# Patient Record
Sex: FEMALE | Race: WHITE | NOT HISPANIC OR LATINO | Employment: OTHER | URBAN - METROPOLITAN AREA
[De-identification: names, ages, dates, MRNs, and addresses within clinical notes are randomized per-mention and may not be internally consistent; named-entity substitution may affect disease eponyms.]

---

## 2018-02-02 ENCOUNTER — OFFICE VISIT (OUTPATIENT)
Dept: MEDICAL GROUP | Facility: MEDICAL CENTER | Age: 63
End: 2018-02-02
Payer: COMMERCIAL

## 2018-02-02 ENCOUNTER — HOSPITAL ENCOUNTER (OUTPATIENT)
Dept: LAB | Facility: MEDICAL CENTER | Age: 63
End: 2018-02-02
Attending: FAMILY MEDICINE
Payer: COMMERCIAL

## 2018-02-02 VITALS
SYSTOLIC BLOOD PRESSURE: 100 MMHG | HEIGHT: 63 IN | WEIGHT: 130 LBS | TEMPERATURE: 97.5 F | OXYGEN SATURATION: 96 % | HEART RATE: 65 BPM | BODY MASS INDEX: 23.04 KG/M2 | DIASTOLIC BLOOD PRESSURE: 62 MMHG

## 2018-02-02 DIAGNOSIS — Z12.11 SCREENING FOR COLON CANCER: ICD-10-CM

## 2018-02-02 DIAGNOSIS — K04.7 DENTAL INFECTION: ICD-10-CM

## 2018-02-02 DIAGNOSIS — L81.9 CHANGING PIGMENTED SKIN LESION: ICD-10-CM

## 2018-02-02 DIAGNOSIS — Z13.220 SCREENING FOR HYPERLIPIDEMIA: ICD-10-CM

## 2018-02-02 DIAGNOSIS — Z13.1 SCREENING FOR DIABETES MELLITUS: ICD-10-CM

## 2018-02-02 DIAGNOSIS — F43.29 PROLONGED GRIEF REACTION: ICD-10-CM

## 2018-02-02 DIAGNOSIS — R30.0 DYSURIA: ICD-10-CM

## 2018-02-02 DIAGNOSIS — E03.9 ACQUIRED HYPOTHYROIDISM: ICD-10-CM

## 2018-02-02 DIAGNOSIS — Z12.31 ENCOUNTER FOR SCREENING MAMMOGRAM FOR BREAST CANCER: ICD-10-CM

## 2018-02-02 DIAGNOSIS — Z13.0 SCREENING FOR DEFICIENCY ANEMIA: ICD-10-CM

## 2018-02-02 LAB
ALBUMIN SERPL BCP-MCNC: 4.3 G/DL (ref 3.2–4.9)
ALBUMIN/GLOB SERPL: 1.7 G/DL
ALP SERPL-CCNC: 52 U/L (ref 30–99)
ALT SERPL-CCNC: 14 U/L (ref 2–50)
ANION GAP SERPL CALC-SCNC: 5 MMOL/L (ref 0–11.9)
APPEARANCE UR: CLEAR
AST SERPL-CCNC: 25 U/L (ref 12–45)
BASOPHILS # BLD AUTO: 0.5 % (ref 0–1.8)
BASOPHILS # BLD: 0.03 K/UL (ref 0–0.12)
BILIRUB SERPL-MCNC: 0.9 MG/DL (ref 0.1–1.5)
BILIRUB UR STRIP-MCNC: NEGATIVE MG/DL
BUN SERPL-MCNC: 11 MG/DL (ref 8–22)
CALCIUM SERPL-MCNC: 9.1 MG/DL (ref 8.5–10.5)
CHLORIDE SERPL-SCNC: 105 MMOL/L (ref 96–112)
CHOLEST SERPL-MCNC: 223 MG/DL (ref 100–199)
CO2 SERPL-SCNC: 28 MMOL/L (ref 20–33)
COLOR UR AUTO: NORMAL
CREAT SERPL-MCNC: 0.7 MG/DL (ref 0.5–1.4)
EOSINOPHIL # BLD AUTO: 0.1 K/UL (ref 0–0.51)
EOSINOPHIL NFR BLD: 1.8 % (ref 0–6.9)
ERYTHROCYTE [DISTWIDTH] IN BLOOD BY AUTOMATED COUNT: 44.2 FL (ref 35.9–50)
GLOBULIN SER CALC-MCNC: 2.6 G/DL (ref 1.9–3.5)
GLUCOSE SERPL-MCNC: 85 MG/DL (ref 65–99)
GLUCOSE UR STRIP.AUTO-MCNC: NEGATIVE MG/DL
HCT VFR BLD AUTO: 43.1 % (ref 37–47)
HDLC SERPL-MCNC: 70 MG/DL
HGB BLD-MCNC: 14.3 G/DL (ref 12–16)
IMM GRANULOCYTES # BLD AUTO: 0.02 K/UL (ref 0–0.11)
IMM GRANULOCYTES NFR BLD AUTO: 0.4 % (ref 0–0.9)
KETONES UR STRIP.AUTO-MCNC: NEGATIVE MG/DL
LDLC SERPL CALC-MCNC: 136 MG/DL
LEUKOCYTE ESTERASE UR QL STRIP.AUTO: NEGATIVE
LYMPHOCYTES # BLD AUTO: 1.48 K/UL (ref 1–4.8)
LYMPHOCYTES NFR BLD: 26.3 % (ref 22–41)
MCH RBC QN AUTO: 31.2 PG (ref 27–33)
MCHC RBC AUTO-ENTMCNC: 33.2 G/DL (ref 33.6–35)
MCV RBC AUTO: 94.1 FL (ref 81.4–97.8)
MONOCYTES # BLD AUTO: 0.4 K/UL (ref 0–0.85)
MONOCYTES NFR BLD AUTO: 7.1 % (ref 0–13.4)
NEUTROPHILS # BLD AUTO: 3.59 K/UL (ref 2–7.15)
NEUTROPHILS NFR BLD: 63.9 % (ref 44–72)
NITRITE UR QL STRIP.AUTO: NEGATIVE
NRBC # BLD AUTO: 0 K/UL
NRBC BLD-RTO: 0 /100 WBC
PH UR STRIP.AUTO: 7.5 [PH] (ref 5–8)
PLATELET # BLD AUTO: 213 K/UL (ref 164–446)
PMV BLD AUTO: 10.2 FL (ref 9–12.9)
POTASSIUM SERPL-SCNC: 3.8 MMOL/L (ref 3.6–5.5)
PROT SERPL-MCNC: 6.9 G/DL (ref 6–8.2)
PROT UR QL STRIP: NEGATIVE MG/DL
RBC # BLD AUTO: 4.58 M/UL (ref 4.2–5.4)
RBC UR QL AUTO: NEGATIVE
SODIUM SERPL-SCNC: 138 MMOL/L (ref 135–145)
SP GR UR STRIP.AUTO: 1
T4 FREE SERPL-MCNC: 1.03 NG/DL (ref 0.53–1.43)
TRIGL SERPL-MCNC: 87 MG/DL (ref 0–149)
TSH SERPL DL<=0.005 MIU/L-ACNC: 1.84 UIU/ML (ref 0.38–5.33)
UROBILINOGEN UR STRIP-MCNC: 0.2 MG/DL
WBC # BLD AUTO: 5.6 K/UL (ref 4.8–10.8)

## 2018-02-02 PROCEDURE — 85025 COMPLETE CBC W/AUTO DIFF WBC: CPT

## 2018-02-02 PROCEDURE — 84439 ASSAY OF FREE THYROXINE: CPT

## 2018-02-02 PROCEDURE — 80053 COMPREHEN METABOLIC PANEL: CPT

## 2018-02-02 PROCEDURE — 84443 ASSAY THYROID STIM HORMONE: CPT

## 2018-02-02 PROCEDURE — 80061 LIPID PANEL: CPT

## 2018-02-02 PROCEDURE — 99214 OFFICE O/P EST MOD 30 MIN: CPT | Performed by: FAMILY MEDICINE

## 2018-02-02 PROCEDURE — 81002 URINALYSIS NONAUTO W/O SCOPE: CPT | Performed by: FAMILY MEDICINE

## 2018-02-02 PROCEDURE — 36415 COLL VENOUS BLD VENIPUNCTURE: CPT

## 2018-02-02 RX ORDER — CLINDAMYCIN HYDROCHLORIDE 300 MG/1
300 CAPSULE ORAL 3 TIMES DAILY
Qty: 21 CAP | Refills: 0 | Status: SHIPPED | OUTPATIENT
Start: 2018-02-02 | End: 2018-10-30

## 2018-02-02 ASSESSMENT — PATIENT HEALTH QUESTIONNAIRE - PHQ9: CLINICAL INTERPRETATION OF PHQ2 SCORE: 0

## 2018-02-02 NOTE — ASSESSMENT & PLAN NOTE
Patient was found to be hypothyroid but the medications made her feel worse so she stopped it over 3 years ago.  Was doing acupuncture and that helped but can't afford it right now.  Denies palpitations, skin changes, temperature intolerance, or changes in bowel habits

## 2018-02-02 NOTE — PATIENT INSTRUCTIONS
Hypothyroidism  Hypothyroidism is a disorder of the thyroid. The thyroid is a large gland that is located in the lower front of the neck. The thyroid releases hormones that control how the body works. With hypothyroidism, the thyroid does not make enough of these hormones.  CAUSES  Causes of hypothyroidism may include:  · Viral infections.  · Pregnancy.  · Your own defense system (immune system) attacking your thyroid.  · Certain medicines.  · Birth defects.  · Past radiation treatments to your head or neck.  · Past treatment with radioactive iodine.  · Past surgical removal of part or all of your thyroid.  · Problems with the gland that is located in the center of your brain (pituitary).  SIGNS AND SYMPTOMS  Signs and symptoms of hypothyroidism may include:  · Feeling as though you have no energy (lethargy).  · Inability to tolerate cold.  · Weight gain that is not explained by a change in diet or exercise habits.  · Dry skin.  · Coarse hair.  · Menstrual irregularity.  · Slowing of thought processes.  · Constipation.  · Sadness or depression.  DIAGNOSIS   Your health care provider may diagnose hypothyroidism with blood tests and ultrasound tests.  TREATMENT  Hypothyroidism is treated with medicine that replaces the hormones that your body does not make. After you begin treatment, it may take several weeks for symptoms to go away.  HOME CARE INSTRUCTIONS   · Take medicines only as directed by your health care provider.  · If you start taking any new medicines, tell your health care provider.  · Keep all follow-up visits as directed by your health care provider. This is important. As your condition improves, your dosage needs may change. You will need to have blood tests regularly so that your health care provider can watch your condition.  SEEK MEDICAL CARE IF:  · Your symptoms do not get better with treatment.  · You are taking thyroid replacement medicine and:  ¨ You sweat excessively.  ¨ You have tremors.  ¨ You  feel anxious.  ¨ You lose weight rapidly.  ¨ You cannot tolerate heat.  ¨ You have emotional swings.  ¨ You have diarrhea.  ¨ You feel weak.  SEEK IMMEDIATE MEDICAL CARE IF:   · You develop chest pain.  · You develop an irregular heartbeat.  · You develop a rapid heartbeat.     This information is not intended to replace advice given to you by your health care provider. Make sure you discuss any questions you have with your health care provider.     Document Released: 12/18/2006 Document Revised: 01/08/2016 Document Reviewed: 05/05/2015  [employer] Patient Education ©2016 [employer]

## 2018-02-04 ENCOUNTER — HOSPITAL ENCOUNTER (OUTPATIENT)
Facility: MEDICAL CENTER | Age: 63
End: 2018-02-04
Attending: FAMILY MEDICINE
Payer: COMMERCIAL

## 2018-02-04 PROCEDURE — 82274 ASSAY TEST FOR BLOOD FECAL: CPT

## 2018-02-07 ENCOUNTER — TELEPHONE (OUTPATIENT)
Dept: MEDICAL GROUP | Facility: MEDICAL CENTER | Age: 63
End: 2018-02-07

## 2018-02-07 NOTE — TELEPHONE ENCOUNTER
Pt notified of normal lab results with the exception of her cholesterol being slightly high. Pt understands and was most concerned with her thyroid.

## 2018-02-12 NOTE — ASSESSMENT & PLAN NOTE
Symptom onset: 7 days ago   Current symptoms: urgent, frequent voids. No blood noted in urine.  Since onset symptoms are: unchanged  Associated symptoms: negative for fever, flank pain, nausea and vomiting, vaginal discharge, pelvic pain.  History is negative for frequent UTI.

## 2018-02-12 NOTE — PROGRESS NOTES
Chief Complaint   Patient presents with   • Establish Care     from lamar Noriega Maria E Scales is a 62 y.o. female here to establish care and for evaluation and management of:    Leann is here to establish care at this office. [facility]      HPI:    Acquired hypothyroidism  Patient was found to be hypothyroid but the medications made her feel worse so she stopped it over 3 years ago.  Was doing acupuncture and that helped but can't afford it right now.  Denies palpitations, skin changes, temperature intolerance, or changes in bowel habits        Prolonged grief reaction  Patient is still struggling with the death of her  and the significant life stressors related to this. She's had financial stressors as she quit her job in without her 's income she feels like she is always behind. She and her daughter went to the [employer] in [place] to celebrate her daughters college graduation. She has a good support network. She denies any suicidal thoughts or plans.    Dysuria  Symptom onset: 7 days ago   Current symptoms: urgent, frequent voids. No blood noted in urine.  Since onset symptoms are: unchanged  Associated symptoms: negative for fever, flank pain, nausea and vomiting, vaginal discharge, pelvic pain.  History is negative for frequent UTI.       Changing pigmented skin lesion  Patient has a changing pigmented skin lesion on her face that she would like a referral to dermatology for. She has had significant sun exposure.      Allergies   Allergen Reactions   • Penicillins Anaphylaxis       Current medicines (including changes today)  Current Outpatient Prescriptions   Medication Sig Dispense Refill   • clindamycin (CLEOCIN) 300 MG Cap Take 1 Cap by mouth 3 times a day. 21 Cap 0     No current facility-administered medications for this visit.      She  has a past medical history of Allergy; Anxiety; Arthritis; Chronic headaches; and IBD  "(inflammatory bowel disease).  She  has a past surgical history that includes abdominal hysterectomy total and eye surgery.  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  No nausea or vomiting.  No chest pain or palpitations.  No cough or SOB.  No pain with urination or hematuria.  No black or bloody stools.  All other systems reviewed and are negative     Objective:     Blood pressure 100/62, pulse 65, temperature 36.4 °C (97.5 °F), height 1.6 m (5' 3\"), weight 59 kg (130 lb), SpO2 96 %. Body mass index is 23.03 kg/m².  Physical Exam:      Well developed, well nourished.  Alert, oriented in no acute distress.  Psych: Eye contact is good, speech goal directed, affect calm  Eyes: conjunctiva non-injected, sclera non-icteric.  Ears: Pinna normal. TM pearly gray.   Nose: Nares are patent.  Normal mucosa  Mouth: Oral mucous membranes pink and moist with no lesions. Slight swelling and tenderness of the left lower    gum at the molarsNeck Supple.  No adenopathy or masses in the neck or supraclavicular regions. No thyromegaly  Lungs: clear to auscultation bilaterally with good excursion. No wheezes or rhonchi  CV: regular rate and rhythm. No murmur  Abdomen: soft, nontender, no masses or organomegaly.  No rebound or guarding  Ext: no edema, color normal, vascularity normal, temperature normal    Skin: Brown irregular lesion on the cheek with some darkened areas    Assessment and Plan:   The following treatment plan was " discussed    1. Prolonged grief reaction  Discussed support groups and increasing exercise. Patient does not want medications.    2. Acquired hypothyroidism  Check labs and await results  - TSH; Future  - FREE THYROXINE; Future    3. Dysuria  Normal urinalysis. Increase fluid intake  - POCT Urinalysis    4. Screening for deficiency anemia  Screening labs ordered.  Await results for counseling.    - CBC WITH DIFFERENTIAL; Future    5. Screening for diabetes mellitus  Screening labs ordered.  Await results for counseling.    - COMP METABOLIC PANEL; Future    6. Screening for hyperlipidemia  Screening labs ordered.  Await results for counseling.    - LIPID PROFILE; Future    7. Encounter for screening mammogram for breast cancer    - MA-SCREEN MAMMO W/CAD-BILAT; Future    8. Screening for colon cancer    - OCCULT BLOOD FECES IMMUNOASSAY (FIT); Future    9. Changing pigmented skin lesion    - REFERRAL TO DERMATOLOGY    10. Dental infection    - clindamycin (CLEOCIN) 300 MG Cap; Take 1 Cap by mouth 3 times a day.  Dispense: 21 Cap; Refill: 0      Records  reviewed  Any change or worsening of signs or symptoms, patient encouraged to follow-up or report to the emergency room for further evaluation. Patient understands and agrees.    Followup: Return in about 1 year (around 2/2/2019).

## 2018-02-12 NOTE — ASSESSMENT & PLAN NOTE
Patient has a changing pigmented skin lesion on her face that she would like a referral to dermatology for. She has had significant sun exposure.

## 2018-02-12 NOTE — ASSESSMENT & PLAN NOTE
Patient is still struggling with the death of her  and the significant life stressors related to this. She's had financial stressors as she quit her job in without her 's income she feels like she is always behind. She and her daughter went to the [employer] in [place] to celebrate her daughters college graduation. She has a good support network. She denies any suicidal thoughts or plans.

## 2018-02-13 DIAGNOSIS — Z12.11 SCREENING FOR COLON CANCER: ICD-10-CM

## 2018-02-14 LAB — HEMOCCULT STL QL IA: NEGATIVE

## 2018-05-17 ENCOUNTER — OFFICE VISIT (OUTPATIENT)
Dept: DERMATOLOGY | Facility: IMAGING CENTER | Age: 63
End: 2018-05-17
Payer: COMMERCIAL

## 2018-05-17 VITALS — HEIGHT: 62 IN | BODY MASS INDEX: 23.37 KG/M2 | WEIGHT: 127 LBS | TEMPERATURE: 97.7 F

## 2018-05-17 DIAGNOSIS — L29.9 PRURITUS: ICD-10-CM

## 2018-05-17 DIAGNOSIS — L81.4 LENTIGINES: ICD-10-CM

## 2018-05-17 DIAGNOSIS — L82.0 SEBORRHEIC KERATOSES, INFLAMED: ICD-10-CM

## 2018-05-17 DIAGNOSIS — D18.01 CHERRY ANGIOMA: ICD-10-CM

## 2018-05-17 PROCEDURE — 99203 OFFICE O/P NEW LOW 30 MIN: CPT | Mod: 25 | Performed by: DERMATOLOGY

## 2018-05-17 PROCEDURE — 17110 DESTRUCTION B9 LES UP TO 14: CPT | Performed by: DERMATOLOGY

## 2018-05-17 ASSESSMENT — ENCOUNTER SYMPTOMS
FEVER: 0
CHILLS: 0

## 2018-05-17 NOTE — PROGRESS NOTES
Dermatology New Patient Visit    Chief Complaint   Patient presents with   • Establish Care       Subjective:     HPI:   Leann Scales is a 63 y.o. female presenting for    Full skin exam, h/o +significant sun exposure    Red spots on the body  Increasing in number over the years  None with recent change in size  One on the right thigh since teens  No itching/bleeding/pain  No treatments    HPI/location: spots along the braline  Time present: 5+ yeras  Painful lesion: Yes  Itching lesion: Yes  Enlarging lesion: Yes  Anything make it better or worse? Avoiding wearing a bra    History of skin cancer: No  History of precancers/actinic keratoses: No  History of biopsies:Yes, Details: benign  History of blistering/severe sunburns:No  Family history of skin cancer:No  Family history of atypical moles:Yes, Details: Father            Past Medical History:   Diagnosis Date   • Allergy    • Anxiety    • Arthritis    • Chronic headaches    • IBD (inflammatory bowel disease)        Current Outpatient Prescriptions on File Prior to Visit   Medication Sig Dispense Refill   • clindamycin (CLEOCIN) 300 MG Cap Take 1 Cap by mouth 3 times a day. 21 Cap 0     No current facility-administered medications on file prior to visit.        Allergies   Allergen Reactions   • Penicillins Anaphylaxis       Family History   Problem Relation Age of Onset   • Stroke Mother    • Heart Disease Father    • Stroke Father        Social History     Social History   • Marital status:      Spouse name: N/A   • Number of children: N/A   • Years of education: N/A     Occupational History   • Not on file.     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   Skin: Positive for itching. Negative for rash.   All other systems reviewed and are negative.       Objective:     A full mucocutaneous exam was completed including: scalp, hair, ears, face, eyelids, conjunctiva, lips, gums/tongue/oropharynx, neck, chest breasts, abdomen, back, left and right upper extremities (including hands/digits and fingernails), left and right lower extremities (including feet/toes, toenails), buttocks, excluding external genitalia (patient refusal) with the following pertinent findings listed below. Remaining above-listed examined areas within normal limits / negative for rashes or lesions.    Temperature 36.5 °C (97.7 °F), height 1.575 m (5' 2\"), weight 57.6 kg (127 lb).    Physical Exam   Constitutional: She is oriented to person, place, and time and well-developed, well-nourished, and in no distress.   HENT:   Head: Normocephalic and atraumatic.   Right Ear: External ear normal.   Left Ear: External ear normal.   Nose: Nose normal.   Mouth/Throat: Oropharynx is clear and moist.   Eyes: Conjunctivae and lids are normal.   Neck: Normal range of motion. Neck supple.   Cardiovascular: Intact distal pulses.    Pulmonary/Chest: Effort normal.   Neurological: She is alert and oriented to person, place, and time.   Skin: Skin is warm and dry.        Psychiatric: Mood and affect normal.   Vitals reviewed.      DATA: none applicable to review    Assessment and Plan:     1. Lentigines  - Benign-appearing nature of lesions discussed. Advised to return to clinic for any new or concerning changes.  - ABCDE's of melanoma discussed    2. Cherry angiomas  - Benign-appearing nature of lesions discussed. Advised to return to clinic for any new or concerning changes.    3. Seborrheic keratoses, inflamed, Pruritus  CRYOTHERAPY:  Risks (including, but not limited to: hypo or hyperpigmentation, " redness, blister, blood blister, recurrence, need for further treatment, infection, scar) and benefits of cryotherapy discussed. Patient verbally agreed to proceed with treatment. 2 cryotherapy freeze thaw cycles of 10 seconds were applied to 4 lesions on the back with cryac. Patient tolerated procedure well. Aftercare instructions given.    Followup: Return in about 1 year (around 5/17/2019) for franko.    Roberta العلي M.D.

## 2018-10-30 ENCOUNTER — OFFICE VISIT (OUTPATIENT)
Dept: MEDICAL GROUP | Facility: LAB | Age: 63
End: 2018-10-30
Payer: COMMERCIAL

## 2018-10-30 VITALS
TEMPERATURE: 98.3 F | HEART RATE: 82 BPM | BODY MASS INDEX: 23.94 KG/M2 | DIASTOLIC BLOOD PRESSURE: 62 MMHG | SYSTOLIC BLOOD PRESSURE: 116 MMHG | HEIGHT: 62 IN | OXYGEN SATURATION: 99 % | WEIGHT: 130.07 LBS | RESPIRATION RATE: 16 BRPM

## 2018-10-30 DIAGNOSIS — E03.9 ACQUIRED HYPOTHYROIDISM: ICD-10-CM

## 2018-10-30 DIAGNOSIS — R53.83 OTHER FATIGUE: ICD-10-CM

## 2018-10-30 PROCEDURE — 99214 OFFICE O/P EST MOD 30 MIN: CPT | Performed by: FAMILY MEDICINE

## 2018-10-30 NOTE — ASSESSMENT & PLAN NOTE
In July she sold her house and she lived there until Septemeber.  She bought a fixer-up condo and moved in there over Labor Day.  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  She is still stressed about finances.  Her daughter thinks she is having more problems remembering things.

## 2018-11-01 NOTE — PROGRESS NOTES
"Subjective:     Chief Complaint   Patient presents with   • Memory Loss       Leann Scales is a 63 y.o. female here today for evaluation and management of:    Other fatigue  In July she sold her house and she lived there until Septemeber.  She bought a fixer-up condo and moved in there over Labor Day.  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  She is still stressed about finances.  Her daughter thinks she is having more problems remembering things.      Acquired hypothyroidism  Patient has a history of subclinical hypothyroidism but is not on any medications.       Allergies   Allergen Reactions   • Penicillins Anaphylaxis       Current medicines (including changes today)  No current outpatient prescriptions on file.     No current facility-administered medications for this visit.        She  has a past medical history of Allergy; Anxiety; Arthritis; Chronic headaches; and IBD (inflammatory bowel disease).    Patient Active Problem List    Diagnosis Date Noted   • Other fatigue 10/30/2018   • Prolonged grief reaction 02/02/2018   • Acquired hypothyroidism 02/02/2018   • Dysuria 02/02/2018   • Changing pigmented skin lesion 02/02/2018   • Dental infection 02/02/2018       ROS   No fever or chills.  No nausea or vomiting.  No chest pain or palpitations.  No cough or SOB.  No pain with urination or hematuria.  No black or bloody stools.       Objective:     Blood pressure 116/62, pulse 82, temperature 36.8 °C (98.3 °F), temperature source Temporal, resp. rate 16, height 1.575 m (5' 2\"), weight 59 kg (130 lb 1.1 oz), SpO2 99 %. Body mass index is 23.79 kg/m².   Physical Exam:  Well developed, well nourished.  Alert, oriented in no acute distress.  Eye contact is good, speech goal directed, affect calm  Eyes: conjunctiva non-injected, sclera non-icteric. PERRL. EOM's intact  Ears: Pinna normal. TM pearly gray.   Nose: Nares are patent.  Normal mucosa  Mouth: Oral mucous membranes pink and moist with no " lesions.  Neck Supple.  No adenopathy or masses in the neck or supraclavicular regions. No thyromegaly  Lungs: clear to auscultation bilaterally with good excursion. No wheezes or rhonchi  CV: regular rate and rhythm. No murmur  Abdomen: soft, nontender, no masses or organomegaly.  No rebound or guarding  Mini-Mental status exam 30/30          Assessment and Plan:   The following treatment plan was discussed    1. Acquired subclinical hypothyroidism  Check labs.  Await results  - TSH; Future  - FREE THYROXINE; Future  - TRIIDOTHYRONINE; Future    2. Other fatigue  Check labs.  Consider an overnight pulse oximetry to rule out sleep apnea.  We did discuss stress reduction techniques.  - CBC WITH DIFFERENTIAL; Future  - COMP METABOLIC PANEL; Future    Any change or worsening of signs or symptoms, patient encouraged to follow-up or report to the emergency room for further evaluation. Patient understands and agrees.    Followup: Return in about 6 months (around 4/30/2019).

## 2019-04-10 ENCOUNTER — OFFICE VISIT (OUTPATIENT)
Dept: URGENT CARE | Facility: CLINIC | Age: 64
End: 2019-04-10
Payer: COMMERCIAL

## 2019-04-10 ENCOUNTER — APPOINTMENT (OUTPATIENT)
Dept: RADIOLOGY | Facility: IMAGING CENTER | Age: 64
End: 2019-04-10
Attending: FAMILY MEDICINE
Payer: COMMERCIAL

## 2019-04-10 VITALS
BODY MASS INDEX: 23.92 KG/M2 | SYSTOLIC BLOOD PRESSURE: 116 MMHG | WEIGHT: 130 LBS | HEIGHT: 62 IN | DIASTOLIC BLOOD PRESSURE: 64 MMHG | HEART RATE: 60 BPM | RESPIRATION RATE: 16 BRPM | TEMPERATURE: 98.3 F | OXYGEN SATURATION: 98 %

## 2019-04-10 DIAGNOSIS — S62.235A CLOSED NONDISPLACED FRACTURE OF BASE OF FIRST METACARPAL BONE OF LEFT HAND, UNSPECIFIED FRACTURE MORPHOLOGY, INITIAL ENCOUNTER: ICD-10-CM

## 2019-04-10 DIAGNOSIS — S63.502A SPRAIN OF LEFT WRIST, INITIAL ENCOUNTER: ICD-10-CM

## 2019-04-10 PROCEDURE — 73110 X-RAY EXAM OF WRIST: CPT | Mod: TC,FY,LT | Performed by: FAMILY MEDICINE

## 2019-04-10 PROCEDURE — 99203 OFFICE O/P NEW LOW 30 MIN: CPT | Performed by: FAMILY MEDICINE

## 2019-04-10 ASSESSMENT — ENCOUNTER SYMPTOMS
JOINT SWELLING: 0
WEAKNESS: 0
FEVER: 0
PAIN: 1
NUMBNESS: 0

## 2019-04-10 NOTE — PROGRESS NOTES
"Subjective:     Leann Scales is a 63 y.o. female who presents for Pain (left wrist pain and injured happened yesterday)       Pain   This is a new problem. The current episode started yesterday. The problem occurs constantly. The problem has been unchanged. Pertinent negatives include no fever, joint swelling, numbness or weakness.     Review of Systems   Constitutional: Negative for fever.   Musculoskeletal: Negative for joint swelling.   Neurological: Negative for weakness and numbness.     Allergies   Allergen Reactions   • Penicillins Anaphylaxis   • Aspirin      Severe stomach ache   • Codeine      She doesn't like the way it feels      Objective:   /64 (BP Location: Right arm, Patient Position: Sitting, BP Cuff Size: Adult)   Pulse 60   Temp 36.8 °C (98.3 °F) (Temporal)   Resp 16   Ht 1.575 m (5' 2\")   Wt 59 kg (130 lb)   SpO2 98%   BMI 23.78 kg/m²   Physical Exam   Constitutional: She is oriented to person, place, and time. She appears well-developed and well-nourished. No distress.   HENT:   Head: Normocephalic and atraumatic.   Eyes: Pupils are equal, round, and reactive to light. Conjunctivae and EOM are normal.   Cardiovascular: Normal rate and regular rhythm.    No murmur heard.  Pulmonary/Chest: Effort normal and breath sounds normal. No respiratory distress.   Abdominal: Soft. She exhibits no distension. There is no tenderness.   Musculoskeletal:        Left wrist: She exhibits bony tenderness. She exhibits no swelling and no effusion.   Neurological: She is alert and oriented to person, place, and time. She has normal reflexes. No sensory deficit.   Skin: Skin is warm and dry.   Psychiatric: She has a normal mood and affect.        Assessment/Plan:   1. Closed nondisplaced fracture of base of first metacarpal bone of left hand, unspecified fracture morphology, initial encounter  - DX-WRIST-COMPLETE 3+ LEFT; Future  - REFERRAL TO SPORTS MEDICINE  Differential diagnosis, natural " history, supportive care, and indications for immediate follow-up discussed.    Splint placed in clinic.

## 2019-04-11 ENCOUNTER — TELEPHONE (OUTPATIENT)
Dept: URGENT CARE | Facility: CLINIC | Age: 64
End: 2019-04-11

## 2019-06-20 ENCOUNTER — OFFICE VISIT (OUTPATIENT)
Dept: DERMATOLOGY | Facility: IMAGING CENTER | Age: 64
End: 2019-06-20
Payer: COMMERCIAL

## 2019-06-20 VITALS — TEMPERATURE: 97.7 F | BODY MASS INDEX: 23.92 KG/M2 | HEIGHT: 62 IN | WEIGHT: 130 LBS

## 2019-06-20 DIAGNOSIS — L29.9 PRURITUS: ICD-10-CM

## 2019-06-20 DIAGNOSIS — L82.0 SEBORRHEIC KERATOSES, INFLAMED: ICD-10-CM

## 2019-06-20 DIAGNOSIS — R20.8 SKIN PAIN: ICD-10-CM

## 2019-06-20 PROCEDURE — 17110 DESTRUCTION B9 LES UP TO 14: CPT | Performed by: DERMATOLOGY

## 2019-06-20 ASSESSMENT — ENCOUNTER SYMPTOMS
CHILLS: 0
FEVER: 0

## 2019-06-20 NOTE — PROGRESS NOTES
"Dermatology Return Patient Visit    Chief Complaint   Patient presents with   • Establish Care   • Skin Lesion       Subjective:     HPI:   Leann Scales is a 64 y.o. female presenting for    HPI/location: rough \"warts\" on the face/neck  Time present: few months  Painful lesion: if scratched  Itching lesion: Yes  Enlarging lesion: Yes  Anything make it better or worse? n/a    History of skin cancer: No  History of precancers/actinic keratoses: No  History of biopsies:Yes, Details: benign  History of blistering/severe sunburns:No  Family history of skin cancer:No  Family history of atypical moles:Yes, Details: Father        Past Medical History:   Diagnosis Date   • Allergy    • Anxiety    • Arthritis    • Chronic headaches    • IBD (inflammatory bowel disease)        No current outpatient prescriptions on file prior to visit.     No current facility-administered medications on file prior to visit.        Allergies   Allergen Reactions   • Codeine      She doesn't like the way it feels   • Penicillins Anaphylaxis   • Aspirin      Severe stomach ache       Family History   Problem Relation Age of Onset   • Stroke Mother    • Heart Disease Father    • Stroke Father        Social History     Social History   • Marital status:      Spouse name: N/A   • Number of children: N/A   • Years of education: N/A     Occupational History   • Not on file.     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   Skin: Positive for itching. Negative for rash.   All other systems reviewed and are negative.       Objective: " "    A focused cutaneous exam was completed including: hair, ears, face, eyelids, conjunctiva, lips, neck, upper chest with the following pertinent findings listed below. Remaining above-listed examined areas within normal limits / negative for rashes or lesions.    Temp 36.5 °C (97.7 °F)   Ht 1.575 m (5' 2\")   Wt 59 kg (130 lb)     Physical Exam   Constitutional: She is oriented to person, place, and time and well-developed, well-nourished, and in no distress.   HENT:   Head: Normocephalic and atraumatic.       Right Ear: External ear normal.   Left Ear: External ear normal.   Nose: Nose normal.   Eyes: Conjunctivae and lids are normal.       Neck: Normal range of motion.   Pulmonary/Chest: Effort normal.   Neurological: She is alert and oriented to person, place, and time.   Skin: Skin is warm and dry.   Psychiatric: Mood and affect normal.       DATA: none applicable to review    Assessment and Plan:     1. Seborrheic keratoses, inflamed, Pruritus, +occ pain  CRYOTHERAPY:  Risks (including, but not limited to: hypo or hyperpigmentation, redness, blister, blood blister, recurrence, need for further treatment, infection, scar) and benefits of cryotherapy discussed. Patient verbally agreed to proceed with treatment. 2 cryotherapy freeze thaw cycles of 10 seconds were applied to 5 lesions on the face (eyebrow, lower cheeks/neck) with cryac. Patient tolerated procedure well. Aftercare instructions given.    Followup: Return in about 6 weeks (around 8/1/2019).    Roberta العلي M.D.      "

## 2019-08-20 ENCOUNTER — HOSPITAL ENCOUNTER (OUTPATIENT)
Dept: RADIOLOGY | Facility: MEDICAL CENTER | Age: 64
End: 2019-08-20
Attending: FAMILY MEDICINE
Payer: COMMERCIAL

## 2019-08-20 ENCOUNTER — OFFICE VISIT (OUTPATIENT)
Dept: MEDICAL GROUP | Facility: LAB | Age: 64
End: 2019-08-20
Payer: COMMERCIAL

## 2019-08-20 VITALS
WEIGHT: 128.8 LBS | HEIGHT: 62 IN | TEMPERATURE: 98.8 F | BODY MASS INDEX: 23.7 KG/M2 | OXYGEN SATURATION: 99 % | DIASTOLIC BLOOD PRESSURE: 64 MMHG | HEART RATE: 77 BPM | SYSTOLIC BLOOD PRESSURE: 112 MMHG

## 2019-08-20 DIAGNOSIS — R09.82 PND (POST-NASAL DRIP): ICD-10-CM

## 2019-08-20 DIAGNOSIS — R05.3 PERSISTENT COUGH: ICD-10-CM

## 2019-08-20 PROCEDURE — 71046 X-RAY EXAM CHEST 2 VIEWS: CPT

## 2019-08-20 PROCEDURE — 99214 OFFICE O/P EST MOD 30 MIN: CPT | Performed by: FAMILY MEDICINE

## 2019-08-20 ASSESSMENT — PATIENT HEALTH QUESTIONNAIRE - PHQ9: CLINICAL INTERPRETATION OF PHQ2 SCORE: 0

## 2019-08-20 NOTE — ASSESSMENT & PLAN NOTE
This has been going on for many years but has worsened in the last few months.  Sometimes she swallows wrong and that will set her off.  She also has irritation in her throat.  She feels like she has PND.  Denies acid reflux.  Not productive.  Feeling more SOB.  She denies any swelling in lower extremities.

## 2019-08-20 NOTE — PATIENT INSTRUCTIONS

## 2019-08-23 NOTE — PROGRESS NOTES
"Subjective:     Chief Complaint   Patient presents with   • Cough       Leann Scales is a 64 y.o. female here today for evaluation and management of:    Persistent cough  This has been going on for many years but has worsened in the last few months.  Sometimes she swallows wrong and that will set her off.  She also has irritation in her throat.  She feels like she has PND.  Denies acid reflux.  Not productive.  Feeling more SOB.  She denies any swelling in lower extremities.       Allergies   Allergen Reactions   • Codeine      She doesn't like the way it feels   • Penicillins Anaphylaxis   • Aspirin      Severe stomach ache       Current medicines (including changes today)  No current outpatient medications on file.     No current facility-administered medications for this visit.        She  has a past medical history of Allergy, Anxiety, Arthritis, Chronic headaches, and IBD (inflammatory bowel disease).    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  No nausea or vomiting.  No chest pain or palpitations.  No cough or SOB.  No pain with urination or hematuria.  No black or bloody stools.       Objective:     /64 (BP Location: Left arm, Patient Position: Sitting)   Pulse 77   Temp 37.1 °C (98.8 °F) (Temporal)   Ht 1.575 m (5' 2\")   Wt 58.4 kg (128 lb 12.8 oz)   SpO2 99%  Body mass index is 23.56 kg/m².   Physical Exam:  Well developed, well nourished.  Alert, oriented in no acute distress.  Eye contact is good, speech goal directed, affect calm  Eyes: conjunctiva non-injected, sclera non-icteric.  Ears: Pinna normal. TM pearly gray.   Nose: Nares are patent.  Normal mucosa  Mouth: Oral " mucous membranes pink and moist with no lesions.  Mild erythema the posterior pharynx with white postnasal drainage  Neck Supple.  No adenopathy or masses in the neck or supraclavicular regions. No thyromegaly  Lungs: clear to auscultation bilaterally with good excursion. No wheezes or rhonchi  CV: regular rate and rhythm. No murmur            Assessment and Plan:   The following treatment plan was discussed    1. Persistent cough  Discussed that this could be acid reflux but patient denies any GERD symptoms.  We will get a chest x-ray to assess.  Refer to ENT for further evaluation and treatment  - DX-CHEST-2 VIEWS; Future  - REFERRAL TO ENT    2. PND (post-nasal drip)  Refer to ENT for further evaluation and treatment.  - REFERRAL TO ENT    Any change or worsening of signs or symptoms, patient encouraged to follow-up or report to the emergency room for further evaluation. Patient understands and agrees.    Followup: Return if symptoms worsen or fail to improve.

## 2020-06-15 ENCOUNTER — OFFICE VISIT (OUTPATIENT)
Dept: MEDICAL GROUP | Facility: LAB | Age: 65
End: 2020-06-15

## 2020-06-15 ENCOUNTER — HOSPITAL ENCOUNTER (OUTPATIENT)
Dept: LAB | Facility: MEDICAL CENTER | Age: 65
End: 2020-06-15
Attending: FAMILY MEDICINE
Payer: COMMERCIAL

## 2020-06-15 VITALS
OXYGEN SATURATION: 98 % | DIASTOLIC BLOOD PRESSURE: 60 MMHG | HEIGHT: 62 IN | HEART RATE: 78 BPM | WEIGHT: 117 LBS | SYSTOLIC BLOOD PRESSURE: 104 MMHG | TEMPERATURE: 97.6 F | RESPIRATION RATE: 12 BRPM | BODY MASS INDEX: 21.53 KG/M2

## 2020-06-15 DIAGNOSIS — R19.04 LEFT LOWER QUADRANT ABDOMINAL MASS: ICD-10-CM

## 2020-06-15 DIAGNOSIS — E78.2 MODERATE MIXED HYPERLIPIDEMIA NOT REQUIRING STATIN THERAPY: ICD-10-CM

## 2020-06-15 DIAGNOSIS — R21 RASH: ICD-10-CM

## 2020-06-15 DIAGNOSIS — D22.9 NUMEROUS MOLES: ICD-10-CM

## 2020-06-15 LAB
CHOLEST SERPL-MCNC: 181 MG/DL (ref 100–199)
FASTING STATUS PATIENT QL REPORTED: NORMAL
HDLC SERPL-MCNC: 60 MG/DL
LDLC SERPL CALC-MCNC: 102 MG/DL
TRIGL SERPL-MCNC: 97 MG/DL (ref 0–149)

## 2020-06-15 PROCEDURE — 36415 COLL VENOUS BLD VENIPUNCTURE: CPT

## 2020-06-15 PROCEDURE — 80061 LIPID PANEL: CPT

## 2020-06-15 PROCEDURE — 99214 OFFICE O/P EST MOD 30 MIN: CPT | Performed by: FAMILY MEDICINE

## 2020-06-15 ASSESSMENT — ENCOUNTER SYMPTOMS
SHORTNESS OF BREATH: 0
CHILLS: 0
WHEEZING: 0
BLURRED VISION: 0
DIARRHEA: 0
CONSTIPATION: 0
VOMITING: 0
FEVER: 0
ABDOMINAL PAIN: 0
NAUSEA: 0
PALPITATIONS: 0

## 2020-06-15 NOTE — PROGRESS NOTES
"Subjective:   Leann Scales is a 65 y.o. female here today for   Chief Complaint   Patient presents with   • Bump     In abdomen    • Other     Thinks she may shingles, brother just had a stoke, wants to check cholestrol, referral request for dermatology, moles.        #Abdominal mass  -Noticed over the last 2 months after losing 20 lbs.   -Location: Lower left quadrant   -non painful   -Has not noted any changes.   -Patient states that after losing the weight she feels a slight \"bump\" that is more visual than anything in her lower left quadrant, hip area.  Concerned it may be a hernia and here for evaluation.    #Shingles   -Over the last couple months patient has noticed patches of dry, itchy skin with \"bumps\" located on her right hip as well as the left side of her mid back just below her bra line.  She states she does not notice anything during the day but at night she will have an intense pruritic sensation that has not helped with any lotions, medications such as Benadryl that she has been taking.  -She states it is not painful but does state that the \"look\" of the rash is similar to a previous episode of shingles she had approximately 13 years ago.    #hyperlipidemia   -Diagnosed back in 2008 with showing elevated cholesterol, LDL.  Patient never been on medications for treatment thereof.  Patient's brother just had a stroke and patient is concerned about her own cholesterol level at this time and requesting repeat labs to be completed.  -Denies any headaches, changes to vision, difficulty speaking/swallowing, facial droop, chest pain, shortness of breath.    #Moles   -Patient states that she has numerous moles over her body, none are \"too concerning\"; however, will get referral to dermatology at this time.    Allergies   Allergen Reactions   • Codeine      She doesn't like the way it feels   • Penicillins Anaphylaxis   • Aspirin      Severe stomach ache         Current medicines (including changes " "today)  No current outpatient medications on file.     No current facility-administered medications for this visit.      She  has a past medical history of Allergy, Anxiety, Arthritis, Chronic headaches, and IBD (inflammatory bowel disease).    ROS   Review of Systems   Constitutional: Negative for chills and fever.   HENT: Negative for hearing loss.    Eyes: Negative for blurred vision.   Respiratory: Negative for shortness of breath and wheezing.    Cardiovascular: Negative for chest pain and palpitations.   Gastrointestinal: Negative for abdominal pain, constipation, diarrhea, nausea and vomiting.   Skin: Positive for rash.      Objective:     Physical Exam:  /60 (BP Location: Right arm, Patient Position: Sitting, BP Cuff Size: Adult)   Pulse 78   Temp 36.4 °C (97.6 °F) (Temporal)   Resp 12   Ht 1.575 m (5' 2\")   Wt 53.1 kg (117 lb)   SpO2 98%  Body mass index is 21.4 kg/m².   Constitutional: Alert, no distress.  Skin: Warm, dry, good turgor, areas of dryness with flake/excoriation marks noted on right hip, upper left back.  On left hip there are noticeable, punctate areas of healing of various stages.  Eye: Equal, round and reactive, conjunctiva clear, lids normal.  ENMT: TM's clear bilaterally, lips without lesions, good dentition, oropharynx clear.  Neck: Trachea midline, no masses, no thyromegaly. No cervical or supraclavicular lymphadenopathy.  Respiratory: Unlabored respiratory effort, lungs clear to auscultation, no wheezes, no rhonchi.  Cardiovascular: Normal S1, S2, no murmur, no edema.  Abdomen: Soft, no masses felt on palpation; however, with Valsalva a slight deformity noted in lower left abdominal quadrant, pelvis area.  The mass was nontender to palpation, no defects noted, no signs of hernia were noted.  Psych: Alert and oriented x3, normal affect and mood.    Assessment and Plan:     1. Left lower quadrant abdominal mass  -Unsure etiology of abdominal mass.  Physical examination is " mostly benign.  Could be related to possible hernia.  No pain felt at this time.  We will continue watchful observation.  If symptoms worsen and she is to follow-up immediately.    2. Rash  -The rash at this time does not appear to be related to shingles given its history as well as presentation.  Most likely some type of eczema or atopic skin rash.  Encouraged good hydration with emollient creams.  Follow-up if symptoms worsen.    3. Moderate mixed hyperlipidemia not requiring statin therapy  -Reviewed patient's labs, previous lipid profile completed 2/2/2018 with an elevated cholesterol of 223, LDL of 136.  -We will check lipid profile at this time.  Encouraged healthy diet and daily exercise.  Will call patient with results.  - Lipid Profile; Future    4. Numerous moles  - REFERRAL TO DERMATOLOGY      Followup: Return if symptoms worsen or fail to improve.         PLEASE NOTE: This dictation was created using voice recognition software. I have made every reasonable attempt to correct obvious errors, but I expect that there are errors of grammar and possibly content that I did not discover before finalizing the note.

## 2020-06-17 ENCOUNTER — TELEPHONE (OUTPATIENT)
Dept: MEDICAL GROUP | Facility: LAB | Age: 65
End: 2020-06-17

## 2020-06-17 RX ORDER — LIDOCAINE 50 MG/G
OINTMENT TOPICAL
Qty: 1 TUBE | Refills: 1 | Status: SHIPPED
Start: 2020-06-17 | End: 2020-08-17

## 2020-06-17 NOTE — TELEPHONE ENCOUNTER
1. Caller Name: Leann Scales                          Call Back Number: [phone] (home)         How would the patient prefer to be contacted with a response: Phone call OK to leave a detailed message    Patient called, she's had shingles before feels the exact same, she can not live with out sleeping and having welts at night on her skin and scratching. Request antiviral cream. In a lot a of pain. Spots are still present. Please reassure patient. Please advise.

## 2020-06-17 NOTE — TELEPHONE ENCOUNTER
Please let her know that I have prescribed lidocaine gel which she can use for the pain/itching sensation. We don't use antiviral cream to treat shingles. Usually we start patients on oral antivirals, but is has to be started within the first 48 hours or it doesn't work.

## 2020-06-17 NOTE — TELEPHONE ENCOUNTER
Phone Number Called: [phone] (home)       Call outcome: Spoke to patient regarding message below.    Message: Called and informed patient of the prescription sent to [facility] patient was upset as I could not confirm prescription did not have benadryl in the prescription. I recommended she dicussed this with the pharmacist, or I can call her back once I find out form the provider.

## 2020-06-17 NOTE — TELEPHONE ENCOUNTER
1. Caller Name: Leann Magallaness                          Call Back Number: [phone] (home)         How would the patient prefer to be contacted with a response: Phone call OK to leave a detailed message.     Monday night put on cream, eucrim? , unsure of name. Didn't really help at all, still itching, Next morning rash did look as bad but needing solution.

## 2020-06-17 NOTE — TELEPHONE ENCOUNTER
It will take several treatments before noting any change. Continue to treat with cream for 1-2 weeks. If still no improvement than let me know.

## 2020-07-01 ENCOUNTER — TELEPHONE (OUTPATIENT)
Dept: MEDICAL GROUP | Facility: LAB | Age: 65
End: 2020-07-01

## 2020-08-17 ENCOUNTER — OFFICE VISIT (OUTPATIENT)
Dept: DERMATOLOGY | Facility: IMAGING CENTER | Age: 65
End: 2020-08-17
Payer: COMMERCIAL

## 2020-08-17 DIAGNOSIS — D22.9 MULTIPLE NEVI: ICD-10-CM

## 2020-08-17 DIAGNOSIS — L82.1 SEBORRHEIC KERATOSES: ICD-10-CM

## 2020-08-17 DIAGNOSIS — D18.01 CHERRY ANGIOMA: ICD-10-CM

## 2020-08-17 DIAGNOSIS — L30.9 DERMATITIS: ICD-10-CM

## 2020-08-17 DIAGNOSIS — Z12.83 SKIN CANCER SCREENING: ICD-10-CM

## 2020-08-17 DIAGNOSIS — L81.4 LENTIGINES: ICD-10-CM

## 2020-08-17 PROCEDURE — 99214 OFFICE O/P EST MOD 30 MIN: CPT | Performed by: NURSE PRACTITIONER

## 2020-08-17 RX ORDER — TRIAMCINOLONE ACETONIDE 1 MG/G
1 OINTMENT TOPICAL 2 TIMES DAILY
Qty: 60 G | Refills: 2 | Status: SHIPPED
Start: 2020-08-17 | End: 2020-08-17

## 2020-08-17 RX ORDER — TRIAMCINOLONE ACETONIDE 1 MG/G
1 OINTMENT TOPICAL 2 TIMES DAILY
Qty: 30 G | Refills: 2 | Status: SHIPPED | OUTPATIENT
Start: 2020-08-17 | End: 2021-03-08

## 2020-08-17 ASSESSMENT — ENCOUNTER SYMPTOMS
CHILLS: 0
FEVER: 0

## 2020-08-17 NOTE — PROGRESS NOTES
Dermatology Return Patient Visit    Chief Complaint   Patient presents with   • Follow-Up     ROXIE        Subjective:     HPI:   Leann Scales is a 64 y.o. female presenting for    Follow up ROXIE   Last exam by dr. العلي last year     History of skin cancer: No  History of precancers/actinic keratoses: No  History of biopsies:Yes, Details: benign  History of blistering/severe sunburns:No  Family history of skin cancer:No  Family history of atypical moles:Yes, Details: Father      Past Medical History:   Diagnosis Date   • Allergy    • Anxiety    • Arthritis    • Chronic headaches    • IBD (inflammatory bowel disease)        No current outpatient medications on file prior to visit.     No current facility-administered medications on file prior to visit.        Allergies   Allergen Reactions   • Codeine      She doesn't like the way it feels   • Penicillins Anaphylaxis   • Aspirin      Severe stomach ache       Family History   Problem Relation Age of Onset   • Stroke Mother    • Heart Disease Father    • Stroke Father        Social History     Socioeconomic History   • Marital status:      Spouse name: Not on file   • Number of children: Not on file   • Years of education: Not on file   • Highest education level: Not on file   Occupational History   • Not on file   Social Needs   • Financial resource strain: Not on file   • Food insecurity     Worry: Not on file     Inability: Not on file   • Transportation needs     Medical: Not on file     Non-medical: Not on file   Tobacco Use   • Smoking status: Former Smoker     Packs/day: 0.50     Years: 13.00     Pack years: 6.50     Quit date: 1987     Years since quittin.5   • Smokeless tobacco: Never Used   Substance and Sexual Activity   • 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   • 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   • Sexual activity: Not on file   Lifestyle   • Physical activity     Days per week: Not on file     Minutes per  session: Not on file   • Stress: Not on file   Relationships   • Social connections     Talks on phone: Not on file     Gets together: Not on file     Attends Taoism service: Not on file     Active member of club or organization: Not on file     Attends meetings of clubs or organizations: Not on file     Relationship status: Not on file   • Intimate partner violence     Fear of current or ex partner: Not on file     Emotionally abused: Not on file     Physically abused: Not on file     Forced sexual activity: Not on file   Other Topics Concern   • Not on file   Social History Narrative   • Not on file       Review of Systems   Constitutional: Negative for chills and fever.   Skin: Positive for itching and rash.   All other systems reviewed and are negative.       Objective:     A full mucocutaneous exam was completed including: hair, ears, face, eyelids, conjunctiva, lips, neck, chest, breasts, abdomen, back , left and right upper extremities (including hands/digits and fingernails), left and right lower extremities (including feet/toes, but toenails covered in polish) and buttocks with the following pertinent findings listed below. Remaining above-listed examined areas within normal limits / negative for rashes or lesions.        There were no vitals taken for this visit.    Physical Exam   Constitutional: She is oriented to person, place, and time and well-developed, well-nourished, and in no distress.   HENT:   Head: Normocephalic and atraumatic.   Right Ear: External ear normal.   Left Ear: External ear normal.   Nose: Nose normal.   Eyes: Conjunctivae and lids are normal.   Neck: Normal range of motion.   Pulmonary/Chest: Effort normal.   Neurological: She is alert and oriented to person, place, and time.   Skin: Skin is warm and dry. Rash noted. No erythema.        Several scattered 1-3mm bright red macules and thin papules on the trunk and extremities    Several scattered tan and light brown macules over  face, trunk and extremities    Several tan light brown skin-colored stuck-on waxy papules scattered on the face, trunk and extremities    Multiple light brown medium brown macules papules scattered over the trunk >> extremities. All with benign-appearing pigment network patterns on dermoscopy           Psychiatric: Mood and affect normal.       DATA: none applicable to review    Assessment and Plan:     1. Dermatitis  - We reviewed dry skin care in detail, including short showers or baths with luke warm water, limited use of fragrance-free soap, generous use of bland fragrance-free emollients within 3 min of exiting the shower or bath, and fragrance free laundry products.  - Start Triamcinolone 0.1% ointment BID to affected areas of the abdomen until the skin is smooth  - We reviewed risks/benefits/expectations of treatment in detail, including side effects of long term topical steroid use (such as striae, atrophy and telangiectasias).       2. Cherry angioma  - Benign-appearing nature of lesions discussed. Advised to return to clinic for any new or concerning changes.      3. Lentigines  - Discussed benign nature of lesions, related to sun exposure  - Discussed sun protection, hand out provided      4. Multiple nevi  - Benign-appearing nature of lesions discussed. Advised to return to clinic for any new or concerning changes.  - ABCDE's of melanoma discussed      5. Seborrheic keratoses  - Benign-appearing nature of lesions discussed. Advised to return to clinic for any new or concerning changes.      6. Skin cancer screening  Skin cancer education  - discussed importance of sun protective clothing, eyewear  - discussed importance of daily use of broad spectrum sunscreen with SPF 30 or greater, as well as need for reapplication ~every 2 hours when exposed to UVR  - discussed importance of regular self-exams, ideally once per month, every 12 months exams in clinic  - ABCDE's of melanoma discussed  - patient to bring  any new or concerning lesions to my attention  - Patient educational handout provided and reviewed with patient        Followup: Return in about 1 year (around 8/17/2021) for ROXIE or sooner for any concerns.    JANESSA Mena.

## 2020-08-28 ENCOUNTER — PATIENT MESSAGE (OUTPATIENT)
Dept: MEDICAL GROUP | Facility: LAB | Age: 65
End: 2020-08-28

## 2020-08-28 ENCOUNTER — NURSE TRIAGE (OUTPATIENT)
Dept: HEALTH INFORMATION MANAGEMENT | Facility: OTHER | Age: 65
End: 2020-08-28

## 2020-08-28 DIAGNOSIS — Z11.59 SCREENING FOR VIRAL AND CHLAMYDIAL DISEASES: ICD-10-CM

## 2020-08-28 DIAGNOSIS — Z11.8 SCREENING FOR VIRAL AND CHLAMYDIAL DISEASES: ICD-10-CM

## 2020-08-28 NOTE — TELEPHONE ENCOUNTER
1. Caller Name: Leann                 Call Back Number: [phone]  [facility] PCP or Specialty Provider: Yes Venita Farris MD        2.  In the last two weeks, has the patient had any new or worsening symptoms (not explained by alternative diagnosis)? No.    3.  Does patient have any comoribidities? None     4.  Has the patient traveled in the last 14 days OR had any known contact with someone who is suspected or confirmed to have COVID-19?  Yes, Patient was exposed to friend who is Covid positive on Saturday. Spent the day with her friend all day and went to [place] all day.     5. POTENTIAL PUI (LOW): Advised to perform self care, monitor for worsening symptoms and to call back in 3 days if no improvement. Instructed to self isolate and contact [facility] at [phone]        Note routed to [facility] Provider: Provider action needed: Patient was exposed to Covid 19 last Saturday.  She is requesting SARS Cov 2 nasal swab test please         Reason for Disposition  • [1] COVID-19 EXPOSURE (Close Contact) AND [2] within last 14 days AND [3] NO cough, fever, or breathing difficulty    Additional Information  • Negative: SEVERE difficulty breathing (e.g., struggling for each breath, speak in single words, bluish lips)  • Negative: Sounds like a life-threatening emergency to the triager  • Negative: [1] Adult has symptoms of COVID-19 (fever, cough, or SOB) AND [2] lab test positive  • Negative: [1] Adult has symptoms of COVID-19 (fever, cough or SOB) AND [2] major community spread where patient lives AND [3] testing not being done for mild symptoms  • Negative: [1] Difficulty breathing (shortness of breath) occurs AND [2] onset > 14 days after COVID-19 EXPOSURE (Close Contact) AND [3] no major community spread  • Negative: [1] Cough occurs AND [2] onset > 14 days after COVID-19 EXPOSURE AND [3] no major community spread  • Negative: [1] Common cold symptoms AND [2] onset > 14 days after COVID-19 EXPOSURE AND [3] no major community  "spread  • Negative: [1] Difficulty breathing occurs AND [2] within 14 days of COVID-19 EXPOSURE (Close Contact)  • Negative: Patient sounds very sick or weak to the triager  • Negative: [1] Fever (or feeling feverish) OR cough AND [2] within 14 Days of COVID-19 EXPOSURE (Close Contact)  • Negative: [1] Fever (or feeling feverish) OR cough occurs AND [2] within 14 days of travel from another country (international travel)  • Negative: [1] Fever (or feeling feverish) OR cough occurs AND [2] within 14 days of travel from a city or area with major community spread  • Negative: [1] Fever (or feeling feverish) OR cough occurs AND [2] living in area with major community spread AND [3] testing being done in the community for symptoms  • Negative: [1] Mild body aches, chills, diarrhea, headache, runny nose, or sore throat AND [2] within 14 days of COVID-19 EXPOSURE  • Negative: [1] COVID-19 EXPOSURE within last 14 days AND [2] NO cough, fever, or breathing difficulty AND [3] exposed person is a healthcare worker who was NOT using all recommended personal protective equipment (i.e., a respirator-N95 mask, eye protection, gloves, and gown)    Answer Assessment - Initial Assessment Questions  1. CLOSE CONTACT: \"Who is the person with the confirmed or suspected COVID-19 infection that you were exposed to?\"      Friend  2. PLACE of CONTACT: \"Where were you when you were exposed to COVID-19?\" (e.g., home, school, medical waiting room; which city?)      With Friend for Saturday  3. TYPE of CONTACT: \"How much contact was there?\" (e.g., sitting next to, live in same house, work in same office, same building)      Spent the day with her friend and went to [employer]  4. DURATION of CONTACT: \"How long were you in contact with the COVID-19 patient?\" (e.g., a few seconds, passed by person, a few minutes, live with the patient)      About 8 hours  5. DATE of CONTACT: \"When did you have contact with a COVID-19 patient?\" (e.g., how many days " "ago)      Saturday August 22  6. TRAVEL: \"Have you traveled out of the country recently?\" If so, \"When and where?\"      * Also ask about out-of-state travel, since the CDC has identified some high risk cities for community spread in the .      * Note: Travel becomes less relevant if there is widespread community transmission where the patient lives.      no  7. COMMUNITY SPREAD: \"Are there lots of cases of COVID-19 (community spread) where you live?\" (See public health department website, if unsure)    * MAJOR community spread: high number of cases; numbers of cases are increasing; many people hospitalized.    * MINOR community spread: low number of cases; not increasing; few or no people hospitalized      no  8. SYMPTOMS: \"Do you have any symptoms?\" (e.g., fever, cough, breathing difficulty)      no  9. PREGNANCY OR POSTPARTUM: \"Is there any chance you are pregnant?\" \"When was your last menstrual period?\" \"Did you deliver in the last 2 weeks?\"      NA  10. HIGH RISK: \"Do you have any heart or lung problems? Do you have a weak immune system?\" (e.g., CHF, COPD, asthma, HIV positive, chemotherapy, renal failure, diabetes mellitus, sickle cell anemia)        no    Protocols used: CORONAVIRUS (COVID-19) EXPOSURE-A-OH      "

## 2020-08-31 ENCOUNTER — PATIENT MESSAGE (OUTPATIENT)
Dept: MEDICAL GROUP | Facility: LAB | Age: 65
End: 2020-08-31

## 2020-08-31 DIAGNOSIS — Z11.8 SCREENING FOR VIRAL AND CHLAMYDIAL DISEASES: ICD-10-CM

## 2020-08-31 DIAGNOSIS — Z11.59 SCREENING FOR VIRAL AND CHLAMYDIAL DISEASES: ICD-10-CM

## 2020-09-01 ENCOUNTER — HOSPITAL ENCOUNTER (OUTPATIENT)
Dept: LAB | Facility: MEDICAL CENTER | Age: 65
End: 2020-09-01
Attending: FAMILY MEDICINE
Payer: COMMERCIAL

## 2020-09-01 ENCOUNTER — HOSPITAL ENCOUNTER (OUTPATIENT)
Facility: MEDICAL CENTER | Age: 65
End: 2020-09-01
Attending: FAMILY MEDICINE

## 2020-09-01 DIAGNOSIS — Z11.59 SCREENING FOR VIRAL AND CHLAMYDIAL DISEASES: ICD-10-CM

## 2020-09-01 DIAGNOSIS — Z11.8 SCREENING FOR VIRAL AND CHLAMYDIAL DISEASES: ICD-10-CM

## 2020-09-01 LAB
COVID ORDER STATUS COVID19: NORMAL
COVID ORDER STATUS COVID19: NORMAL

## 2020-09-01 PROCEDURE — U0003 INFECTIOUS AGENT DETECTION BY NUCLEIC ACID (DNA OR RNA); SEVERE ACUTE RESPIRATORY SYNDROME CORONAVIRUS 2 (SARS-COV-2) (CORONAVIRUS DISEASE [COVID-19]), AMPLIFIED PROBE TECHNIQUE, MAKING USE OF HIGH THROUGHPUT TECHNOLOGIES AS DESCRIBED BY CMS-2020-01-R: HCPCS

## 2020-09-01 PROCEDURE — C9803 HOPD COVID-19 SPEC COLLECT: HCPCS

## 2020-09-02 LAB
SARS-COV-2 RNA RESP QL NAA+PROBE: NOTDETECTED
SPECIMEN SOURCE: NORMAL

## 2021-03-08 ENCOUNTER — OFFICE VISIT (OUTPATIENT)
Dept: DERMATOLOGY | Facility: IMAGING CENTER | Age: 66
End: 2021-03-08
Payer: COMMERCIAL

## 2021-03-08 DIAGNOSIS — R20.8 OTHER DISTURBANCES OF SKIN SENSATION: ICD-10-CM

## 2021-03-08 DIAGNOSIS — D48.5 NEOPLASM OF UNCERTAIN BEHAVIOR OF SKIN: ICD-10-CM

## 2021-03-08 DIAGNOSIS — L82.0 INFLAMED SEBORRHEIC KERATOSIS: ICD-10-CM

## 2021-03-08 PROCEDURE — 11102 TANGNTL BX SKIN SINGLE LES: CPT | Mod: 59 | Performed by: NURSE PRACTITIONER

## 2021-03-08 PROCEDURE — 17110 DESTRUCTION B9 LES UP TO 14: CPT | Performed by: NURSE PRACTITIONER

## 2021-03-08 RX ORDER — HYDROQUINONE 8% 8 G/100G
EMULSION TOPICAL
COMMUNITY
Start: 2021-02-09

## 2021-03-08 ASSESSMENT — ENCOUNTER SYMPTOMS
FEVER: 0
CHILLS: 0

## 2021-03-08 NOTE — PROGRESS NOTES
Dermatology Return Patient Visit    Chief Complaint   Patient presents with   • Skin Lesion       Subjective:     HPI:   Leann Scales is a 65 y.o. female presenting for    HPI/location: Lt shoulder blade, mole came off   Time present: lifetime   Painful lesion: Yes  Itching lesion: Yes  Enlarging lesion: No  Anything make it better or worse?    HPI/location: Lt armpit, moles get caught on clothes   Time present: 5+ yrs   Painful lesion: Yes  Itching lesion: Yes  Enlarging lesion: No  Anything make it better or worse?      History of skin cancer: No  History of precancers/actinic keratoses: No  History of biopsies:Yes, Details: benign  History of blistering/severe sunburns:No  Family history of skin cancer:No  Family history of atypical moles:Yes, Details: Father      Past Medical History:   Diagnosis Date   • Allergy    • Anxiety    • Arthritis    • Chronic headaches    • IBD (inflammatory bowel disease)        Current Outpatient Medications on File Prior to Visit   Medication Sig Dispense Refill   • Hydroquinone 8 % Emulsion APPLY TO AFFECTED AREA OF FACE AT BEDTIME       No current facility-administered medications on file prior to visit.       Allergies   Allergen Reactions   • Codeine      She doesn't like the way it feels   • Penicillins Anaphylaxis   • Aspirin      Severe stomach ache       Family History   Problem Relation Age of Onset   • Stroke Mother    • Heart Disease Father    • Stroke Father        Social History     Socioeconomic History   • Marital status:      Spouse name: Not on file   • Number of children: Not on file   • Years of education: Not on file   • Highest education level: Not on file   Occupational History   • Not on file   Tobacco Use   • Smoking status: Former Smoker     Packs/day: 0.50     Years: 13.00     Pack years: 6.50     Quit date: 1987     Years since quittin.1   • Smokeless tobacco: Never Used   Substance and Sexual Activity   • Alcohol use: No     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   • 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   • Sexual activity: Not on file   Other Topics Concern   • Not on file   Social History Narrative   • Not on file     Social Determinants of Health     Financial Resource Strain:    • Difficulty of Paying Living Expenses:    Food Insecurity:    • Worried About Running Out of Food in the Last Year:    • Ran Out of Food in the Last Year:    Transportation Needs:    • Lack of Transportation (Medical):    • Lack of Transportation (Non-Medical):    Physical Activity:    • Days of Exercise per Week:    • Minutes of Exercise per Session:    Stress:    • Feeling of Stress :    Social Connections:    • Frequency of Communication with Friends and Family:    • Frequency of Social Gatherings with Friends and Family:    • Attends Jew Services:    • Active Member of Clubs or Organizations:    • Attends Club or Organization Meetings:    • Marital Status:    Intimate Partner Violence:    • Fear of Current or Ex-Partner:    • Emotionally Abused:    • Physically Abused:    • Sexually Abused:        Review of Systems   Constitutional: Negative for chills and fever.   Skin: Negative for itching and rash.   All other systems reviewed and are negative.       Objective:     A full mucocutaneous exam was completed including: hair, ears, face, eyelids, conjunctiva, lips, neck, chest, breasts, abdomen, back , left and right upper extremities (including hands/digits and fingernails), left and right lower extremities (including feet/toes, but toenails covered in polish) and buttocks with the following pertinent findings listed below. Remaining above-listed examined areas within normal limits / negative for rashes or lesions.    -5mm Lt shoulder blade, crusty papule       There were no vitals taken for this visit.    Physical Exam   Constitutional: She is oriented to person, place, and time and well-developed, well-nourished, and in no distress.   HENT:   Head:  Normocephalic and atraumatic.   Right Ear: External ear normal.   Left Ear: External ear normal.   Nose: Nose normal.   Eyes: Conjunctivae and lids are normal.   Pulmonary/Chest: Effort normal.   Musculoskeletal:      Cervical back: Normal range of motion.   Neurological: She is alert and oriented to person, place, and time.   Skin: Skin is warm and dry. No rash noted. No erythema.               Psychiatric: Mood and affect normal.       DATA: none applicable to review    Assessment and Plan:     1. Neoplasm of uncertain behavior of skin  Procedure Note   Procedure: Biopsy by shave technique  Location: left scapula  Size: as noted in exam  Preoperative diagnosis:SK vs. other  Risks, benefits and alternatives of procedure discussed and written informed consent obtained for procedure and verbal consent for photos. Time out completed. Area of biopsy prepped with alcohol. Anesthesia with 1% lidocaine with epinephrine administered with 30 gauge needle. Shave biopsy of the site performed. Hemostasis achieved with pressure and aluminum chloride. Vaseline applied to wound with bandage. Patient tolerated procedure well and there were no complications. The specimen was sent to the pathology lab by the staff. Wound care was discussed.      2. Inflamed seborrheic keratosis  CRYOTHERAPY:  Risks (including, but not limited to: hypo or hyperpigmentation, redness, blister, blood blister, recurrence, need for further treatment, infection, scar) and benefits of cryotherapy discussed. Patient verbally agreed to proceed with treatment. 2 cryotherapy freeze thaw cycles of 10 seconds were applied to 3 lesion on left  scapule with cryac. Patient tolerated procedure well. Aftercare instructions given.      3. Other disturbances of skin sensation  R/t to ISK see above        Followup: Return for pending biopsy results.    JANESSA Mena.

## 2021-03-09 ENCOUNTER — OFFICE VISIT (OUTPATIENT)
Dept: MEDICAL GROUP | Facility: LAB | Age: 66
End: 2021-03-09
Payer: COMMERCIAL

## 2021-03-09 VITALS
BODY MASS INDEX: 20.38 KG/M2 | OXYGEN SATURATION: 98 % | WEIGHT: 115 LBS | TEMPERATURE: 98.7 F | SYSTOLIC BLOOD PRESSURE: 100 MMHG | RESPIRATION RATE: 16 BRPM | HEART RATE: 71 BPM | DIASTOLIC BLOOD PRESSURE: 64 MMHG | HEIGHT: 63 IN

## 2021-03-09 DIAGNOSIS — Z12.12 SCREENING FOR COLORECTAL CANCER: ICD-10-CM

## 2021-03-09 DIAGNOSIS — Z12.11 SCREENING FOR COLORECTAL CANCER: ICD-10-CM

## 2021-03-09 DIAGNOSIS — R19.03 BILATERAL LOWER QUADRANT ABDOMINAL MASS: ICD-10-CM

## 2021-03-09 DIAGNOSIS — L23.9 ALLERGIC DERMATITIS: ICD-10-CM

## 2021-03-09 DIAGNOSIS — R19.04 BILATERAL LOWER QUADRANT ABDOMINAL MASS: ICD-10-CM

## 2021-03-09 PROCEDURE — 99214 OFFICE O/P EST MOD 30 MIN: CPT | Performed by: FAMILY MEDICINE

## 2021-03-09 RX ORDER — TRIAMCINOLONE ACETONIDE 1 MG/G
CREAM TOPICAL
Qty: 15 G | Refills: 1 | Status: SHIPPED | OUTPATIENT
Start: 2021-03-09

## 2021-03-09 ASSESSMENT — PATIENT HEALTH QUESTIONNAIRE - PHQ9: CLINICAL INTERPRETATION OF PHQ2 SCORE: 0

## 2021-03-09 NOTE — PATIENT INSTRUCTIONS
Femoral Hernia, Adult    Having a femoral hernia means that fat or part of the intestine has pushed through a weak area between muscles into an opening in the lower groin (femoral canal). A femoral hernia may be present at birth, but it may not cause symptoms until you are an adult. You may also develop a femoral hernia as you get older. A femoral hernia tends to get worse over time. If it is not treated, it will not go away.  There are several types of femoral hernias. You may have:  · A hernia that comes and goes (reducible hernia). You may be able to see it only when you strain, lift something heavy, or cough. This type of hernia can be pushed back into the abdomen (reduced).  · A hernia that traps abdominal tissue inside the hernia (incarcerated hernia). This type of hernia cannot be reduced.  · A hernia that cuts off blood flow to the tissues inside the hernia (strangulated hernia). Without blood supply, these tissues can start to die. This type of hernia requires emergency treatment.  What are the causes?  The cause is usually not known. This condition can be triggered by:  · Coughing.  · Suddenly straining the muscles of the abdomen.  · Lifting heavy objects.  · Straining to have a bowel movement. Constipation can lead to a femoral hernia.  What increases the risk?  You have a greater risk for a femoral hernia if you:  · Are female.  · Frequently lift heavy objects.  · Smoke or have lung disease.  · Are often constipated.  · Strain to pass urine.  · Are overweight.  What are the signs or symptoms?  In many cases, a femoral hernia does not cause symptoms. The most common symptom is a bulge in the upper thigh or groin. In women, the bulge may form on the outside of the vagina instead. Other symptoms may include:  · Mild pain or pressure.  · Numbness.  Symptoms of a strangulated femoral hernia include:  · Sharp or increasing pain.  · Nausea and vomiting.  · Redness or darkening color of the hernia bulge.  How is  this diagnosed?  This condition is diagnosed based on:  · Your symptoms.  · Your medical history.  · A physical exam. You may be asked to cough or strain while standing. These actions increase the pressure inside your abdomen and force the hernia through the opening in your muscles. Your health care provider may try to reduce the hernia by pressing on it.  · Imaging tests, such as:  ? Ultrasound.  ? CT scan.  How is this treated?  Surgery is the only treatment for a femoral hernia. A strangulated hernia requires emergency surgery.  Follow these instructions at home:  Activity  · Return to your normal activities as told by your health care provider. Ask your health care provider what activities are safe for you.  · Do not lift anything that is heavier than 10 lb (4.5 kg), or the limit that you are told, until your health care provider says that it is safe.  Eating and drinking    · Follow instructions from your health care provider about eating or drinking restrictions.  · Eat more fiber to prevent constipation. Foods that contain fiber include fruits, vegetables, and whole grains.  · Drink enough fluid to keep your urine pale yellow. This also helps to prevent constipation.  General instructions  · Take over-the-counter and prescription medicines only as told by your health care provider.  · If you are overweight, work with your health care provider to safely lose weight.  · Do not use any products that contain nicotine or tobacco, such as cigarettes and e-cigarettes. If you need help quitting, ask your health care provider.  · Keep all follow-up visits as told by your health care provider. This is important.  Contact a health care provider if:  · Your hernia becomes uncomfortable.  · Your hernia gets larger and you cannot reduce it.  · You are constipated. Signs of constipation include:  ? Fewer bowel movements in a week than normal.  ? Difficulty having a bowel movement.  ? Stools that are dry, hard, or larger  than normal.  · You strain to pass urine.  Get help right away if:  · Your hernia suddenly becomes painful.  · You have hernia pain that suddenly gets worse.  · You have hernia pain along with any of the following:  ? Chills.  ? Fever.  ? Nausea.  ? Vomiting.  · Your hernia bulge becomes dark, red, or painful to touch.  Summary  · Having a femoral hernia means that fat or part of the intestine has pushed through a weak area between muscles into an opening in the lower groin (femoral canal).  · The most common sign of a femoral hernia is a bulge in the upper thigh or groin. In women, the bulge may form on the outside of the vagina instead.  · Surgery is the only treatment for a femoral hernia. If this type of hernia is not treated, it will not go away.  This information is not intended to replace advice given to you by your health care provider. Make sure you discuss any questions you have with your health care provider.  Document Released: 05/24/2018 Document Revised: 11/30/2018 Document Reviewed: 05/24/2018  Elsevier Patient Education © 2020 [employer]

## 2021-03-09 NOTE — ASSESSMENT & PLAN NOTE
Patient with itching rash on her abdomen for several weeks.  She denies any new contacts or new foods.  She reports this not painful just more irritating.

## 2021-03-15 ENCOUNTER — TELEPHONE (OUTPATIENT)
Dept: DERMATOLOGY | Facility: IMAGING CENTER | Age: 66
End: 2021-03-15

## 2021-03-15 NOTE — TELEPHONE ENCOUNTER
Patient notified of D _path results  Left scapula Bx , benign . No further treatment . Scanned in media tab

## 2021-03-17 NOTE — PROGRESS NOTES
"Subjective:     Chief Complaint   Patient presents with   • Bump     abdominal area   • Rash     abdominal area       Leann Scales is a 65 y.o. female here today for evaluation and management of:    Allergic dermatitis  Patient with itching rash on her abdomen for several weeks.  She denies any new contacts or new foods.  She reports this not painful just more irritating.     Patient is also complaining of a lump in the inguinal area bilaterally.  She has lost a couple of pounds and noticed it more than.  It is not tender to palpation.  It does not seem to get bigger or smaller.  Allergies   Allergen Reactions   • Codeine      She doesn't like the way it feels   • Penicillins Anaphylaxis   • Aspirin      Severe stomach ache       Current medicines (including changes today)  Current Outpatient Medications   Medication Sig Dispense Refill   • triamcinolone acetonide (KENALOG) 0.1 % Cream Apply thin film to trunk rash BID until clear 15 g 1   • Hydroquinone 8 % Emulsion APPLY TO AFFECTED AREA OF FACE AT BEDTIME       No current facility-administered medications for this visit.       She  has a past medical history of Allergy, Anxiety, Arthritis, Chronic headaches, and IBD (inflammatory bowel disease).    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  No nausea or vomiting.  No chest pain or palpitations.  No cough or SOB.  No pain with urination or hematuria.  No black or bloody stools.       Objective:     /64 (BP Location: Right arm, Patient Position: Sitting, BP Cuff Size: Adult)   Pulse 71   Temp 37.1 °C (98.7 °F) (Temporal)   Resp 16   Ht 1.588 m (5' 2.5\") "   Wt 52.2 kg (115 lb)   SpO2 98%  Body mass index is 20.7 kg/m².   Physical Exam:  Well developed, well nourished.  Alert, oriented in no acute distress.  Eye contact is good, speech goal directed, affect calm  Eyes: conjunctiva non-injected, sclera non-icteric.  Neck Supple.  No adenopathy or masses in the neck or supraclavicular regions. No thyromegaly  Lungs: clear to auscultation bilaterally with good excursion. No wheezes or rhonchi  CV: regular rate and rhythm. No murmur  Abdomen: soft, nontender, no  organomegaly.  No rebound or guarding.  There are 2 subtle lumps at the top of the femoral canal that do not seem to increase in size with Valsalva.  Additionally there is an erythematous rough rash on the upper abdomen and trunk            Assessment and Plan:   The following treatment plan was discussed    1. Bilateral lower quadrant abdominal mass  Ultrasound to assess.  Await results.  Signs and symptoms of incarcerated hernia discussed  - US-HERNIA ABDOMEN; Future    2. Allergic dermatitis  Triamcinolone cream twice a day until clear  - triamcinolone acetonide (KENALOG) 0.1 % Cream; Apply thin film to trunk rash BID until clear  Dispense: 15 g; Refill: 1    3. Screening for colorectal cancer  For to gastroenterology  - REFERRAL TO GI FOR COLONOSCOPY    Any change or worsening of signs or symptoms, patient encouraged to follow-up or report to the emergency room for further evaluation. Patient understands and agrees.    Followup: Return if symptoms worsen or fail to improve.

## 2021-06-14 ENCOUNTER — HOSPITAL ENCOUNTER (OUTPATIENT)
Dept: RADIOLOGY | Facility: MEDICAL CENTER | Age: 66
End: 2021-06-14
Attending: FAMILY MEDICINE
Payer: COMMERCIAL

## 2021-06-14 DIAGNOSIS — R19.03 BILATERAL LOWER QUADRANT ABDOMINAL MASS: ICD-10-CM

## 2021-06-14 DIAGNOSIS — R19.04 BILATERAL LOWER QUADRANT ABDOMINAL MASS: ICD-10-CM

## 2021-06-14 PROCEDURE — 76857 US EXAM PELVIC LIMITED: CPT

## 2022-01-20 ENCOUNTER — OFFICE VISIT (OUTPATIENT)
Dept: MEDICAL GROUP | Facility: LAB | Age: 67
End: 2022-01-20
Payer: COMMERCIAL

## 2022-01-20 VITALS
WEIGHT: 120 LBS | HEART RATE: 74 BPM | BODY MASS INDEX: 21.26 KG/M2 | SYSTOLIC BLOOD PRESSURE: 100 MMHG | RESPIRATION RATE: 12 BRPM | OXYGEN SATURATION: 97 % | DIASTOLIC BLOOD PRESSURE: 60 MMHG | HEIGHT: 63 IN | TEMPERATURE: 97.8 F

## 2022-01-20 DIAGNOSIS — M26.621 ARTHRALGIA OF RIGHT TEMPOROMANDIBULAR JOINT: ICD-10-CM

## 2022-01-20 PROCEDURE — 99213 OFFICE O/P EST LOW 20 MIN: CPT | Performed by: NURSE PRACTITIONER

## 2022-01-20 RX ORDER — CYCLOBENZAPRINE HCL 5 MG
5 TABLET ORAL NIGHTLY PRN
Qty: 10 TABLET | Refills: 0 | Status: SHIPPED | OUTPATIENT
Start: 2022-01-20

## 2022-01-20 RX ORDER — IBUPROFEN 600 MG/1
600 TABLET ORAL EVERY 8 HOURS PRN
Qty: 30 TABLET | Refills: 0 | Status: SHIPPED | OUTPATIENT
Start: 2022-01-20

## 2022-01-20 ASSESSMENT — PATIENT HEALTH QUESTIONNAIRE - PHQ9: CLINICAL INTERPRETATION OF PHQ2 SCORE: 0

## 2022-01-21 NOTE — PROGRESS NOTES
"Chief Complaint   Patient presents with   • Otalgia     right ear X 4 days         HPI:  Leann is a 67 yo est female here with c/o new onset right ear pain after a spa day, 4 d ago.  Also mentions getting braces off and newly wearing retainers.  Points to right TM joint region and ear.  Using heating pad which helps.  Pain worse with eating.  Today, no pain with eating breakfast but eating lunch brought her to tears with the associated ear pain.    No hx of same since childhood.   No denies any other symptoms except fever/ chills / ST.     Exam:  /60 (BP Location: Left arm, Patient Position: Sitting, BP Cuff Size: Adult)   Pulse 74   Temp 36.6 °C (97.8 °F)   Resp 12   Ht 1.588 m (5' 2.5\")   Wt 54.4 kg (120 lb)   SpO2 97%   Constitutional: Alert, no distress, plus 3 vital signs  Skin:  Warm, dry, no rashes invisible areas  Eye: Equal, round and reactive, conjunctiva clear  ENMT: Bilateral tympanic membranes are  translucent without erythema or perforation. no sinus tenderness. She experiences significant pain with opening right TM joint.  Neck: no LAD.  Respiratory: Unlabored respiration  Cardiovascular: Normal rate  Psych: Alert, pleasant, well-groomed, normal affect    A/P:  1. Arthralgia of right temporomandibular joint  ibuprofen (MOTRIN) 600 MG Tab    cyclobenzaprine (FLEXERIL) 5 mg tablet   discussed dx of TMJ in depth.  Encouraged a strict soft diet for a week and continued heating pad.  Start ibuprofen 600 mg tid with food - discussed GI and renal precautions.  Recommend flexeril 5 mg before bed for a few nights to prevent clinching.  F/u 3-4 days if not improving. Encouraged her to speak with her orthodontist as well.    "

## 2022-01-27 ENCOUNTER — PATIENT MESSAGE (OUTPATIENT)
Dept: MEDICAL GROUP | Facility: LAB | Age: 67
End: 2022-01-27

## 2022-01-27 DIAGNOSIS — R68.84 JAW PAIN: ICD-10-CM

## 2022-01-31 DIAGNOSIS — M26.623 BILATERAL TEMPOROMANDIBULAR JOINT PAIN: ICD-10-CM

## 2022-07-21 ENCOUNTER — APPOINTMENT (RX ONLY)
Dept: URBAN - METROPOLITAN AREA CLINIC 38 | Facility: CLINIC | Age: 67
Setting detail: DERMATOLOGY
End: 2022-07-21

## 2022-07-21 DIAGNOSIS — Z71.89 OTHER SPECIFIED COUNSELING: ICD-10-CM

## 2022-07-21 DIAGNOSIS — L82.1 OTHER SEBORRHEIC KERATOSIS: ICD-10-CM

## 2022-07-21 DIAGNOSIS — L81.4 OTHER MELANIN HYPERPIGMENTATION: ICD-10-CM

## 2022-07-21 DIAGNOSIS — D18.0 HEMANGIOMA: ICD-10-CM

## 2022-07-21 DIAGNOSIS — D22 MELANOCYTIC NEVI: ICD-10-CM

## 2022-07-21 DIAGNOSIS — L82.0 INFLAMED SEBORRHEIC KERATOSIS: ICD-10-CM

## 2022-07-21 PROBLEM — D18.01 HEMANGIOMA OF SKIN AND SUBCUTANEOUS TISSUE: Status: ACTIVE | Noted: 2022-07-21

## 2022-07-21 PROBLEM — D22.9 MELANOCYTIC NEVI, UNSPECIFIED: Status: ACTIVE | Noted: 2022-07-21

## 2022-07-21 PROCEDURE — ? LIQUID NITROGEN

## 2022-07-21 PROCEDURE — ? COUNSELING

## 2022-07-21 PROCEDURE — 99203 OFFICE O/P NEW LOW 30 MIN: CPT | Mod: 25

## 2022-07-21 PROCEDURE — 17110 DESTRUCTION B9 LES UP TO 14: CPT | Mod: 52

## 2022-07-21 ASSESSMENT — LOCATION DETAILED DESCRIPTION DERM
LOCATION DETAILED: LEFT MEDIAL TRAPEZIAL NECK
LOCATION DETAILED: INFERIOR MID FOREHEAD
LOCATION DETAILED: LEFT CLAVICULAR NECK
LOCATION DETAILED: LEFT DISTAL DORSAL FOREARM
LOCATION DETAILED: RIGHT CENTRAL TEMPLE
LOCATION DETAILED: RIGHT INFERIOR UPPER BACK
LOCATION DETAILED: PERIUMBILICAL SKIN
LOCATION DETAILED: RIGHT PROXIMAL DORSAL FOREARM
LOCATION DETAILED: LEFT DISTAL CALF
LOCATION DETAILED: RIGHT MEDIAL TRAPEZIAL NECK

## 2022-07-21 ASSESSMENT — LOCATION ZONE DERM
LOCATION ZONE: ARM
LOCATION ZONE: LEG
LOCATION ZONE: NECK
LOCATION ZONE: TRUNK
LOCATION ZONE: FACE

## 2022-07-21 ASSESSMENT — LOCATION SIMPLE DESCRIPTION DERM
LOCATION SIMPLE: LEFT CALF
LOCATION SIMPLE: LEFT FOREARM
LOCATION SIMPLE: INFERIOR FOREHEAD
LOCATION SIMPLE: ABDOMEN
LOCATION SIMPLE: RIGHT FOREARM
LOCATION SIMPLE: RIGHT UPPER BACK
LOCATION SIMPLE: LEFT ANTERIOR NECK
LOCATION SIMPLE: RIGHT TEMPLE
LOCATION SIMPLE: POSTERIOR NECK

## 2022-07-21 NOTE — PROCEDURE: LIQUID NITROGEN
Show Aperture Variable?: Yes
Number Of Freeze-Thaw Cycles: 2 freeze-thaw cycles
Render Note In Bullet Format When Appropriate: No
Medical Necessity Clause: This procedure was medically necessary because the lesions that were treated were:
Medical Necessity Information: It is in your best interest to select a reason for this procedure from the list below. All of these items fulfill various CMS LCD requirements except the new and changing color options.
Detail Level: Detailed
Post-Care Instructions: I reviewed with the patient in detail post-care instructions. Patient is to wear sunprotection, and avoid picking at any of the treated lesions. Pt may apply Vaseline to crusted or scabbing areas.
Spray Paint Text: The liquid nitrogen was applied to the skin utilizing a spray paint frosting technique.
Consent: The patient's consent was obtained including but not limited to risks of crusting, scabbing, blistering, scarring, darker or lighter pigmentary change, recurrence, incomplete removal and infection.

## 2023-04-17 ENCOUNTER — PATIENT MESSAGE (OUTPATIENT)
Dept: HEALTH INFORMATION MANAGEMENT | Facility: OTHER | Age: 68
End: 2023-04-17

## 2023-06-30 ENCOUNTER — DOCUMENTATION (OUTPATIENT)
Dept: HEALTH INFORMATION MANAGEMENT | Facility: OTHER | Age: 68
End: 2023-06-30

## 2023-08-30 ENCOUNTER — APPOINTMENT (RX ONLY)
Dept: URBAN - METROPOLITAN AREA CLINIC 38 | Facility: CLINIC | Age: 68
Setting detail: DERMATOLOGY
End: 2023-08-30

## 2023-08-30 DIAGNOSIS — L82.1 OTHER SEBORRHEIC KERATOSIS: ICD-10-CM

## 2023-08-30 DIAGNOSIS — L85.3 XEROSIS CUTIS: ICD-10-CM

## 2023-08-30 DIAGNOSIS — L57.0 ACTINIC KERATOSIS: ICD-10-CM

## 2023-08-30 DIAGNOSIS — L82.0 INFLAMED SEBORRHEIC KERATOSIS: ICD-10-CM

## 2023-08-30 DIAGNOSIS — L98.8 OTHER SPECIFIED DISORDERS OF THE SKIN AND SUBCUTANEOUS TISSUE: ICD-10-CM

## 2023-08-30 PROCEDURE — ? COUNSELING

## 2023-08-30 PROCEDURE — 17110 DESTRUCTION B9 LES UP TO 14: CPT | Mod: 52

## 2023-08-30 PROCEDURE — 17000 DESTRUCT PREMALG LESION: CPT | Mod: 59

## 2023-08-30 PROCEDURE — 99213 OFFICE O/P EST LOW 20 MIN: CPT | Mod: 25

## 2023-08-30 PROCEDURE — ? REFERRAL CORRESPONDENCE

## 2023-08-30 PROCEDURE — ? LIQUID NITROGEN

## 2023-08-30 ASSESSMENT — LOCATION ZONE DERM
LOCATION ZONE: FACE
LOCATION ZONE: TRUNK
LOCATION ZONE: LIP

## 2023-08-30 ASSESSMENT — LOCATION SIMPLE DESCRIPTION DERM
LOCATION SIMPLE: RIGHT UPPER BACK
LOCATION SIMPLE: LEFT UPPER BACK
LOCATION SIMPLE: ABDOMEN
LOCATION SIMPLE: RIGHT CHEEK
LOCATION SIMPLE: RIGHT LOWER LIP

## 2023-08-30 ASSESSMENT — LOCATION DETAILED DESCRIPTION DERM
LOCATION DETAILED: RIGHT SUPERIOR MEDIAL UPPER BACK
LOCATION DETAILED: RIGHT RIB CAGE
LOCATION DETAILED: PERIUMBILICAL SKIN
LOCATION DETAILED: RIGHT INFERIOR VERMILION BORDER
LOCATION DETAILED: LEFT MEDIAL UPPER BACK
LOCATION DETAILED: RIGHT CENTRAL BUCCAL CHEEK

## 2023-08-30 NOTE — PROCEDURE: LIQUID NITROGEN
Detail Level: Detailed
Render Post-Care Instructions In Note?: no
Show Aperture Variable?: Yes
Consent: The patient's consent was obtained including but not limited to risks of crusting, scabbing, blistering, scarring, darker or lighter pigmentary change, recurrence, incomplete removal and infection.
Duration Of Freeze Thaw-Cycle (Seconds): 0
Post-Care Instructions: I reviewed with the patient in detail post-care instructions. Patient is to wear sunprotection, and avoid picking at any of the treated lesions. Pt may apply Vaseline to crusted or scabbing areas.
Number Of Freeze-Thaw Cycles: 2 freeze-thaw cycles
Medical Necessity Clause: This procedure was medically necessary because the lesions that were treated were:
Medical Necessity Information: It is in your best interest to select a reason for this procedure from the list below. All of these items fulfill various CMS LCD requirements except the new and changing color options.
Spray Paint Text: The liquid nitrogen was applied to the skin utilizing a spray paint frosting technique.

## 2024-01-15 ENCOUNTER — TELEPHONE (OUTPATIENT)
Dept: RESEARCH | Facility: MEDICAL CENTER | Age: 69
End: 2024-01-15

## 2024-01-15 NOTE — TELEPHONE ENCOUNTER
Patient contacted and left voicemail for [employer]. Patient was referred to Medical Genetics and has questions regarding insurance coverage. Patient information was shared with Medical Genetics department to reach out and answer questions.